# Patient Record
Sex: FEMALE | Race: WHITE | Employment: FULL TIME | URBAN - METROPOLITAN AREA
[De-identification: names, ages, dates, MRNs, and addresses within clinical notes are randomized per-mention and may not be internally consistent; named-entity substitution may affect disease eponyms.]

---

## 2017-09-15 ENCOUNTER — HOSPITAL ENCOUNTER (OUTPATIENT)
Dept: MAMMOGRAPHY | Facility: CLINIC | Age: 47
Discharge: HOME | End: 2017-09-15
Attending: FAMILY MEDICINE | Admitting: FAMILY MEDICINE
Payer: COMMERCIAL

## 2017-09-15 DIAGNOSIS — Z12.31 VISIT FOR SCREENING MAMMOGRAM: ICD-10-CM

## 2017-09-15 PROCEDURE — G0202 SCR MAMMO BI INCL CAD: HCPCS

## 2018-03-01 ENCOUNTER — HOSPITAL ENCOUNTER (OUTPATIENT)
Dept: MRI IMAGING | Facility: CLINIC | Age: 48
Discharge: HOME | End: 2018-03-01
Attending: FAMILY MEDICINE | Admitting: FAMILY MEDICINE
Payer: COMMERCIAL

## 2018-03-01 ENCOUNTER — TELEPHONE (OUTPATIENT)
Dept: MAMMOGRAPHY | Facility: CLINIC | Age: 48
End: 2018-03-01

## 2018-03-01 DIAGNOSIS — Z80.3 FAMILY HISTORY OF BREAST CANCER: ICD-10-CM

## 2018-03-01 PROCEDURE — 77059 MR BREAST BILATERAL W/O & W CONTRAST: CPT

## 2018-03-01 PROCEDURE — 25000128 H RX IP 250 OP 636: Performed by: FAMILY MEDICINE

## 2018-03-01 PROCEDURE — A9585 GADOBUTROL INJECTION: HCPCS | Performed by: FAMILY MEDICINE

## 2018-03-01 RX ORDER — GADOBUTROL 604.72 MG/ML
10 INJECTION INTRAVENOUS ONCE
Status: COMPLETED | OUTPATIENT
Start: 2018-03-01 | End: 2018-03-01

## 2018-03-01 RX ADMIN — GADOBUTROL 8 ML: 604.72 INJECTION INTRAVENOUS at 08:18

## 2018-03-01 NOTE — TELEPHONE ENCOUNTER
Patient notified of within normal limits breast MRI.  Instructed patient to have annual screening mammogram.  She will be due in 6 months.  Informed her to speak with her PCP regarding future breast MRIs.

## 2018-09-18 ENCOUNTER — HOSPITAL ENCOUNTER (OUTPATIENT)
Dept: MAMMOGRAPHY | Facility: CLINIC | Age: 48
Discharge: HOME | End: 2018-09-18
Attending: FAMILY MEDICINE | Admitting: FAMILY MEDICINE
Payer: COMMERCIAL

## 2018-09-18 DIAGNOSIS — Z12.31 VISIT FOR SCREENING MAMMOGRAM: ICD-10-CM

## 2018-09-18 PROCEDURE — 77063 BREAST TOMOSYNTHESIS BI: CPT

## 2019-05-23 ENCOUNTER — TELEPHONE (OUTPATIENT)
Dept: MAMMOGRAPHY | Facility: CLINIC | Age: 49
End: 2019-05-23

## 2019-05-23 ENCOUNTER — HOSPITAL ENCOUNTER (OUTPATIENT)
Dept: MRI IMAGING | Facility: CLINIC | Age: 49
Discharge: HOME | End: 2019-05-23
Attending: FAMILY MEDICINE | Admitting: FAMILY MEDICINE
Payer: COMMERCIAL

## 2019-05-23 DIAGNOSIS — Z80.3 FAMILY HISTORY OF BREAST CANCER: ICD-10-CM

## 2019-05-23 DIAGNOSIS — Z12.39 SCREENING FOR BREAST CANCER: ICD-10-CM

## 2019-05-23 PROCEDURE — 77049 MRI BREAST C-+ W/CAD BI: CPT

## 2019-05-23 PROCEDURE — A9585 GADOBUTROL INJECTION: HCPCS | Performed by: FAMILY MEDICINE

## 2019-05-23 PROCEDURE — 25500064 ZZH RX 255 OP 636: Performed by: FAMILY MEDICINE

## 2019-05-23 RX ORDER — GADOBUTROL 604.72 MG/ML
10 INJECTION INTRAVENOUS ONCE
Status: COMPLETED | OUTPATIENT
Start: 2019-05-23 | End: 2019-05-23

## 2019-05-23 RX ADMIN — GADOBUTROL 8 ML: 604.72 INJECTION INTRAVENOUS at 08:00

## 2019-05-23 NOTE — TELEPHONE ENCOUNTER
Called patient with MRI results informing her that it has been recommended she return for an US of the left breast. She agrees.  Informed her that once orders to do so have been received by her primary care MD, a  will be contacting her to set this up Patient voices understanding.

## 2019-06-04 ENCOUNTER — HOSPITAL ENCOUNTER (OUTPATIENT)
Dept: ULTRASOUND IMAGING | Facility: CLINIC | Age: 49
Discharge: HOME | End: 2019-06-04
Attending: FAMILY MEDICINE | Admitting: FAMILY MEDICINE
Payer: COMMERCIAL

## 2019-06-04 DIAGNOSIS — R93.89 ABNORMAL MRI: ICD-10-CM

## 2019-06-04 PROCEDURE — 76642 ULTRASOUND BREAST LIMITED: CPT | Mod: LT

## 2019-06-28 ENCOUNTER — HOSPITAL ENCOUNTER (OUTPATIENT)
Dept: MRI IMAGING | Facility: CLINIC | Age: 49
Discharge: HOME | End: 2019-06-28
Attending: FAMILY MEDICINE | Admitting: FAMILY MEDICINE
Payer: COMMERCIAL

## 2019-06-28 ENCOUNTER — HOSPITAL ENCOUNTER (OUTPATIENT)
Dept: MAMMOGRAPHY | Facility: CLINIC | Age: 49
End: 2019-06-28
Attending: FAMILY MEDICINE
Payer: COMMERCIAL

## 2019-06-28 DIAGNOSIS — R92.8 ABNORMAL MAGNETIC RESONANCE IMAGING OF LEFT BREAST: ICD-10-CM

## 2019-06-28 PROCEDURE — 88305 TISSUE EXAM BY PATHOLOGIST: CPT | Performed by: FAMILY MEDICINE

## 2019-06-28 PROCEDURE — 88305 TISSUE EXAM BY PATHOLOGIST: CPT | Mod: 26 | Performed by: FAMILY MEDICINE

## 2019-06-28 PROCEDURE — 88360 TUMOR IMMUNOHISTOCHEM/MANUAL: CPT | Performed by: FAMILY MEDICINE

## 2019-06-28 PROCEDURE — 00000159 ZZHCL STATISTIC H-SEND OUTS PREP: Performed by: FAMILY MEDICINE

## 2019-06-28 PROCEDURE — 25800025 ZZH RX 258: Performed by: FAMILY MEDICINE

## 2019-06-28 PROCEDURE — 25000125 ZZHC RX 250: Performed by: FAMILY MEDICINE

## 2019-06-28 PROCEDURE — 88341 IMHCHEM/IMCYTCHM EA ADD ANTB: CPT | Mod: 26 | Performed by: FAMILY MEDICINE

## 2019-06-28 PROCEDURE — 00000158 ZZHCL STATISTIC H-FISH PROCESS B/S: Performed by: FAMILY MEDICINE

## 2019-06-28 PROCEDURE — 88377 M/PHMTRC ALYS ISHQUANT/SEMIQ: CPT | Performed by: PATHOLOGY

## 2019-06-28 PROCEDURE — A9585 GADOBUTROL INJECTION: HCPCS | Performed by: FAMILY MEDICINE

## 2019-06-28 PROCEDURE — 88360 TUMOR IMMUNOHISTOCHEM/MANUAL: CPT | Mod: 26 | Performed by: FAMILY MEDICINE

## 2019-06-28 PROCEDURE — 88342 IMHCHEM/IMCYTCHM 1ST ANTB: CPT | Performed by: FAMILY MEDICINE

## 2019-06-28 PROCEDURE — 88341 IMHCHEM/IMCYTCHM EA ADD ANTB: CPT | Performed by: FAMILY MEDICINE

## 2019-06-28 PROCEDURE — 25500064 ZZH RX 255 OP 636: Performed by: FAMILY MEDICINE

## 2019-06-28 PROCEDURE — 88342 IMHCHEM/IMCYTCHM 1ST ANTB: CPT | Mod: 26,59 | Performed by: FAMILY MEDICINE

## 2019-06-28 PROCEDURE — 40000986 MA POST PROCEDURE LEFT

## 2019-06-28 PROCEDURE — 27210135 MR BREAST PRCTNUS CORE NDL BX 1ST LSN RT OR LT

## 2019-06-28 RX ORDER — ACYCLOVIR 200 MG/1
60 CAPSULE ORAL ONCE
Status: COMPLETED | OUTPATIENT
Start: 2019-06-28 | End: 2019-06-28

## 2019-06-28 RX ORDER — LIDOCAINE HYDROCHLORIDE 10 MG/ML
10 INJECTION, SOLUTION EPIDURAL; INFILTRATION; INTRACAUDAL; PERINEURAL ONCE
Status: DISCONTINUED | OUTPATIENT
Start: 2019-06-28 | End: 2019-06-28

## 2019-06-28 RX ORDER — GADOBUTROL 604.72 MG/ML
8 INJECTION INTRAVENOUS ONCE
Status: COMPLETED | OUTPATIENT
Start: 2019-06-28 | End: 2019-06-28

## 2019-06-28 RX ADMIN — SODIUM CHLORIDE 60 ML: 9 INJECTION, SOLUTION INTRAMUSCULAR; INTRAVENOUS; SUBCUTANEOUS at 09:46

## 2019-06-28 RX ADMIN — LIDOCAINE HYDROCHLORIDE 10 ML: 10; .005 INJECTION, SOLUTION EPIDURAL; INFILTRATION; INTRACAUDAL; PERINEURAL at 09:46

## 2019-06-28 RX ADMIN — GADOBUTROL 8 ML: 604.72 INJECTION INTRAVENOUS at 09:46

## 2019-06-28 RX ADMIN — LIDOCAINE HYDROCHLORIDE 6 ML: 10 INJECTION, SOLUTION EPIDURAL; INFILTRATION; INTRACAUDAL; PERINEURAL at 09:46

## 2019-06-28 NOTE — DISCHARGE INSTRUCTIONS
After Your Breast Biopsy    Bleeding or bruising: Slight bruising is normal.  If you bleed through the bandage, put direct pressure on the breast.  If you are still bleeding after 20 minutes, call the doctor who ordered the exam.    Bandages: Keep your bandage in place until tomorrow morning.  Do not get it wet.  Leave the tape in place for two days.  On the second day, cover it with a Band-Aid.    Activity: You may shower the morning after the exam.  No heavy activity (lifting, vacuuming) for 24 hours.    Discomfort: Wear your bra overnight to support the breast.  You may take Tylenol (acetaminophen) for pain.  If you had a stereotactic of MR-directed biopsy, you may take aspirin or ibuprofen (Advil, Motrin) the morning after your biopsy, unless your doctor tells you not to.    Infection: Infection is rare.  Symptoms include fever, redness, increasing pain and fluid draining from the biopsy site.  If you have any of these symptoms, please call the doctor who ordered your exam.    Results: Results may take up to three business days.  If you have not heard your results in three days, call the Breast Center Nurse at [phone] or [phone].  In rare cases, we may need to do another biopsy.    Call the doctor who ordered your exam if:    You have bleeding that lasts more than 20 minutes.    You have pain that cannot be controlled.    You have signs of infection (fever, redness, drainage or other signs).    You have not had your results within three days.    Nurse navigator: Our nurse navigator is here to answer your questions and help you set up future clinic visits.  Please call [phone].    Thank you for choosing [facility].  Please call us if you have questions or concerns about your biopsy.

## 2019-07-02 ENCOUNTER — TELEPHONE (OUTPATIENT)
Dept: SURGERY | Facility: CLINIC | Age: 49
End: 2019-07-02

## 2019-07-02 LAB — COPATH REPORT: NORMAL

## 2019-07-02 NOTE — TELEPHONE ENCOUNTER
Call returned by patient.  verified.     Patient notified pathology results from left breast biopsy performed on 2019 revealed: invasive mammary carcinoma, grade 1. ER/ND (+). HER2 pending.     Per radiologist, Dr. Mu Lilly, results are concordant with imaging findings.    Recommendation: Surgical Consultation.      Patient is scheduled to meet with Dr. Og Petersen on 7/10/2019 check in at 1:30 pm at [facility].     Patient does not want to be notified via phone of her HER2 results. She will wait for surgical consultation. Patient will call our office in the interim with additional questions/concerns.     Both parties in agreement of above plan.    Malika BARNESN, RN, OCN  Oncology Care Coordinator  [facility]  [phone]

## 2019-07-02 NOTE — TELEPHONE ENCOUNTER
Call placed to patient with results of left breast biopsy. Left message requesting call back. No phi left on VM.    Malika BARNESN, RN, OCN  Oncology Care Coordinator  [facility]  [phone]

## 2019-07-03 LAB — COPATH REPORT: NORMAL

## 2019-07-10 ENCOUNTER — OFFICE VISIT (OUTPATIENT)
Dept: SURGERY | Facility: CLINIC | Age: 49
End: 2019-07-10
Payer: COMMERCIAL

## 2019-07-10 VITALS
DIASTOLIC BLOOD PRESSURE: 70 MMHG | HEIGHT: 71 IN | WEIGHT: 160 LBS | BODY MASS INDEX: 22.4 KG/M2 | HEART RATE: 71 BPM | OXYGEN SATURATION: 98 % | RESPIRATION RATE: 16 BRPM | SYSTOLIC BLOOD PRESSURE: 118 MMHG

## 2019-07-10 DIAGNOSIS — Z17.0 MALIGNANT NEOPLASM OF UPPER-OUTER QUADRANT OF LEFT BREAST IN FEMALE, ESTROGEN RECEPTOR POSITIVE (H): Primary | ICD-10-CM

## 2019-07-10 DIAGNOSIS — C50.412 MALIGNANT NEOPLASM OF UPPER-OUTER QUADRANT OF LEFT BREAST IN FEMALE, ESTROGEN RECEPTOR POSITIVE (H): Primary | ICD-10-CM

## 2019-07-10 PROCEDURE — 99244 OFF/OP CNSLTJ NEW/EST MOD 40: CPT | Performed by: SURGERY

## 2019-07-10 ASSESSMENT — MIFFLIN-ST. JEOR: SCORE: 1441.89

## 2019-07-10 NOTE — PROGRESS NOTES
Surgery Consultation, [facility], SOHEILA Petersen MD    Kathy Garrido MRN# [id number]   YOB: 1970 Age: 48 year old     PCP:  Malika Dennison [phone]    Chief Complaint: Newly diagnosed lobular carcinoma in the left breast    Pt was seen in consultation from Malika Dennison.    History of Present Illness:  Kathy Garrido is a 48 year old female who presented with a new diagnosis of breast cancer.  Patient is a healthy 48-year-old female with a very strong family history of breast cancer.  Her sister was diagnosed with breast cancer in her late 30s and underwent treatment.  She was then diagnosed with a second primary several years later, when she was in her early 40s.  Underwent bilateral mastectomy as well as chemotherapy and radiation.  Patient's mother was diagnosed with breast cancer in her 40s and underwent mastectomy.  Both are still living, although her mother was recently diagnosed with pancreatic cancer and underwent what sounds like a Whipple procedure at [facility] last year.  The patient has been undergoing annual screening MRIs in addition to mammography for the last several years.  A 1.1 cm area of concern was located in the left superior breast.  No correlate was seen on ultrasound and an MRI guided biopsy was performed.  This revealed invasive lobular carcinoma, grade 1.  This was ER ME positive, HER-2/tyson negative.  Patient has not undergone any previous breast biopsies.  She is otherwise fairly healthy.  She is here to discuss the significance of her recent diagnosis.    PMH:  Kathy Garrido  has no past medical history on file.  PSH:  Kathy Garrido  has a past surgical history that includes Dilation and curettage; Sling transpubo without anterior colporrhaphy (11/17/2011); and Remove mesh vagina (7/19/2012).    Home medications and allergies reviewed.    Social History:  Kathy Garrido  reports that she has never  "smoked. She has never used smokeless tobacco. She reports that she drinks alcohol. She reports that she does not use drugs.  Family History:  Kathy Garrido family history includes Breast Cancer in her maternal aunt, mother, and paternal grandmother; Breast Cancer (age of onset: 35) in her sister; Pancreatic Cancer in her mother.    ROS:  The 10 point Review of Systems is negative other than noted in the HPI.  No fevers or chills.  No recent weight loss or weight gain.  No headaches or back pain.    Physical Exam:  Blood pressure 118/70, pulse 71, resp. rate 16, height 1.803 m (5' 11\"), weight 72.6 kg (160 lb), last menstrual period 06/20/2019, SpO2 98 %, not currently breastfeeding.  160 lbs 0 oz  Tall thin healthy female in no distress.  Patient has a pleasant affect and communicates well.   Pupils equal round and reactive to light.   No cervical lymphadenopathy or thyromegaly.   Lung fields clear, breathing comfortably.   Heart normal sinus rhythm.  No murmurs rubs or gallops.  Bilateral breast exam performed.  Small breasts bilaterally.  Some bruising in the left.  No obvious discrete lesion but some significant central breast density.  No axillary lymphadenopathy on either side.  No discrete masses on the right.  Skin warm, dry.  No obvious rashes or lesions.    All new lab and imaging data was reviewed.  This includes review of the pathology reports, previous imaging studies, and personal review of the MRI images with the patient and her significant other.     Assessment/plan: Pleasant 48-year-old female, otherwise fairly healthy, with a new diagnosis of invasive lobular carcinoma in the left breast.  This is a small low-grade lesion.  She has an excellent prognosis based on the specifics of this tumor.  Her very strong family history is a complicating factor, however.  Per patient report, both her mother and sister have undergone genetic testing.  Although both were negative for any BRCA mutation, a " different mutation with a slightly increased breast cancer risk was identified.  Given the patient's fairly onerous screening protocol and risk for subsequent breast cancer recurrence or additional primaries, I have encouraged her to strongly consider bilateral mastectomy.  This would be excellent treatment for her existing breast cancer and provide the greatest protection against additional breast cancers.  I think she is a good candidate for nipple sparing mastectomy.  I did explain to the patient that lumpectomy with postlumpectomy radiation carried a similar 20-year survival to mastectomy.  I have arranged for her to meet with the plastic surgeon to discuss these options.  She was inclined to defer surgery until August or September, and I feel this is safe.  I will discuss her plan once she has met with plastic surgery.  We will also have her meet with genetic counseling at some point in the near future.  Surgical co-morbities include previous vaginal surgery, strong family history of breast cancer.    Og Pteersen M.D.  [facility]  [phone]    Total time spent 60 minutes, 50 minutes of which were spent discussing breast cancer algorithms, staging, and treatment protocols.    Please route or send letter to:  Primary Care Provider (PCP) and Referring Provider

## 2019-07-25 ENCOUNTER — TRANSFERRED RECORDS (OUTPATIENT)
Dept: HEALTH INFORMATION MANAGEMENT | Facility: CLINIC | Age: 49
End: 2019-07-25

## 2019-07-29 ENCOUNTER — HOSPITAL ENCOUNTER (OUTPATIENT)
Facility: CLINIC | Age: 49
End: 2019-07-29
Attending: SURGERY | Admitting: SURGERY
Payer: COMMERCIAL

## 2019-07-30 ENCOUNTER — TELEPHONE (OUTPATIENT)
Dept: SURGERY | Facility: CLINIC | Age: 49
End: 2019-07-30

## 2019-07-30 NOTE — TELEPHONE ENCOUNTER
Name of caller: Patient    Reason for Call:  Scheduled for bilateral mastectomy on 9/10 and would like to speak to nurse regarding procedure/post-op    Surgeon:  Dr. Petersen     Recent Surgery:  No    If yes, when & what type:  N/A      Best phone number to reach pt at is: [phone]  Ok to leave a message with medical info? Yes.    Pharmacy preferred (if calling for a refill): N/A

## 2019-07-31 NOTE — TELEPHONE ENCOUNTER
Kathy was seen at [facility] for a second opinion regarding her newly diagnosed breast cancer. Patient would like a follow up visit with Prem Petersen and Jennifer to discuss Hills's findings and recommendations.     Kathy is scheduled to meet with Dr. Rodriguez on 8/21 at 1:30 pm ( per Chantale ) and Dr. Petersen on 8/21 at 2:30 pm at [facility].     All parties are in agreement of above.    Malika Ernandez BSN, RN, OCN  Oncology Care Coordinator  [facility]  [phone]

## 2019-08-02 RX ORDER — CEFAZOLIN SODIUM 2 G/100ML
2 INJECTION, SOLUTION INTRAVENOUS
Status: CANCELLED | OUTPATIENT
Start: 2019-08-02

## 2019-08-02 RX ORDER — CEFAZOLIN SODIUM 1 G/3ML
1 INJECTION, POWDER, FOR SOLUTION INTRAMUSCULAR; INTRAVENOUS SEE ADMIN INSTRUCTIONS
Status: CANCELLED | OUTPATIENT
Start: 2019-08-02

## 2019-08-08 ENCOUNTER — TELEPHONE (OUTPATIENT)
Dept: SURGERY | Facility: CLINIC | Age: 49
End: 2019-08-08

## 2019-08-08 RX ORDER — CEFAZOLIN SODIUM 1 G/3ML
1 INJECTION, POWDER, FOR SOLUTION INTRAMUSCULAR; INTRAVENOUS SEE ADMIN INSTRUCTIONS
Status: CANCELLED | OUTPATIENT
Start: 2019-08-08

## 2019-08-08 RX ORDER — CEFAZOLIN SODIUM 2 G/100ML
2 INJECTION, SOLUTION INTRAVENOUS
Status: CANCELLED | OUTPATIENT
Start: 2019-08-08

## 2019-08-09 DIAGNOSIS — Z17.0 MALIGNANT NEOPLASM OF UPPER-OUTER QUADRANT OF LEFT BREAST IN FEMALE, ESTROGEN RECEPTOR POSITIVE (H): Primary | ICD-10-CM

## 2019-08-09 DIAGNOSIS — C50.412 MALIGNANT NEOPLASM OF UPPER-OUTER QUADRANT OF LEFT BREAST IN FEMALE, ESTROGEN RECEPTOR POSITIVE (H): Primary | ICD-10-CM

## 2019-08-09 NOTE — TELEPHONE ENCOUNTER
Type of surgery: Bilateral nipple sparing mastectomy with left sentinel lymph node biopsy, possible left axillary lymph node dissection  Location of surgery: [facility]  Date and time of surgery: 9/10/19 at 9am  Surgeon: Dr. Yinka Petersen  Pre-Op Appt Date: Patient to schedule  Post-Op Appt Date: Patient to schedule  Packet sent out: Yes  Pre-cert/Authorization completed:  N/A  Date: 8/8/19

## 2019-08-19 ENCOUNTER — TELEPHONE (OUTPATIENT)
Dept: SURGERY | Facility: CLINIC | Age: 49
End: 2019-08-19

## 2019-08-19 NOTE — TELEPHONE ENCOUNTER
Call received from patient's , Og. Per Og, please cancel all upcoming appointments and surgery. Patient has decided to proceed with her care for her newly diagnosed breast cancer at [facility]. All parties in agreement of plan.    Malika BARNESN, RN, OCN  Oncology Care Coordinator  [facility]  [phone]